# Patient Record
Sex: MALE | Race: BLACK OR AFRICAN AMERICAN | NOT HISPANIC OR LATINO
[De-identification: names, ages, dates, MRNs, and addresses within clinical notes are randomized per-mention and may not be internally consistent; named-entity substitution may affect disease eponyms.]

---

## 2017-01-27 PROBLEM — Z00.00 ENCOUNTER FOR PREVENTIVE HEALTH EXAMINATION: Status: ACTIVE | Noted: 2017-01-27

## 2017-01-30 ENCOUNTER — APPOINTMENT (OUTPATIENT)
Dept: CARDIOLOGY | Facility: CLINIC | Age: 62
End: 2017-01-30

## 2017-02-10 ENCOUNTER — APPOINTMENT (OUTPATIENT)
Dept: CARDIOLOGY | Facility: CLINIC | Age: 62
End: 2017-02-10

## 2017-02-17 ENCOUNTER — APPOINTMENT (OUTPATIENT)
Dept: CARDIOLOGY | Facility: CLINIC | Age: 62
End: 2017-02-17

## 2017-03-03 ENCOUNTER — APPOINTMENT (OUTPATIENT)
Dept: CARDIOLOGY | Facility: CLINIC | Age: 62
End: 2017-03-03

## 2017-03-10 ENCOUNTER — APPOINTMENT (OUTPATIENT)
Dept: CARDIOLOGY | Facility: CLINIC | Age: 62
End: 2017-03-10

## 2017-03-15 ENCOUNTER — APPOINTMENT (OUTPATIENT)
Dept: CARDIOLOGY | Facility: CLINIC | Age: 62
End: 2017-03-15

## 2017-03-17 ENCOUNTER — APPOINTMENT (OUTPATIENT)
Dept: CARDIOLOGY | Facility: CLINIC | Age: 62
End: 2017-03-17

## 2017-03-21 ENCOUNTER — APPOINTMENT (OUTPATIENT)
Dept: CARDIOLOGY | Facility: CLINIC | Age: 62
End: 2017-03-21

## 2018-01-10 ENCOUNTER — NON-APPOINTMENT (OUTPATIENT)
Age: 63
End: 2018-01-10

## 2018-01-10 ENCOUNTER — APPOINTMENT (OUTPATIENT)
Dept: CARDIOLOGY | Facility: CLINIC | Age: 63
End: 2018-01-10
Payer: COMMERCIAL

## 2018-01-10 VITALS
DIASTOLIC BLOOD PRESSURE: 86 MMHG | HEIGHT: 71 IN | BODY MASS INDEX: 36.96 KG/M2 | SYSTOLIC BLOOD PRESSURE: 146 MMHG | HEART RATE: 88 BPM | WEIGHT: 264 LBS

## 2018-01-10 DIAGNOSIS — Z87.891 PERSONAL HISTORY OF NICOTINE DEPENDENCE: ICD-10-CM

## 2018-01-10 PROCEDURE — 99214 OFFICE O/P EST MOD 30 MIN: CPT

## 2018-01-10 PROCEDURE — 93000 ELECTROCARDIOGRAM COMPLETE: CPT

## 2018-01-18 ENCOUNTER — APPOINTMENT (OUTPATIENT)
Dept: CARDIOLOGY | Facility: CLINIC | Age: 63
End: 2018-01-18
Payer: COMMERCIAL

## 2018-01-18 PROCEDURE — 93015 CV STRESS TEST SUPVJ I&R: CPT

## 2018-01-18 PROCEDURE — A9502: CPT

## 2018-01-18 PROCEDURE — 78452 HT MUSCLE IMAGE SPECT MULT: CPT

## 2018-02-06 ENCOUNTER — MEDICATION RENEWAL (OUTPATIENT)
Age: 63
End: 2018-02-06

## 2018-02-07 ENCOUNTER — APPOINTMENT (OUTPATIENT)
Dept: CARDIOLOGY | Facility: CLINIC | Age: 63
End: 2018-02-07
Payer: COMMERCIAL

## 2018-02-07 VITALS
WEIGHT: 261 LBS | SYSTOLIC BLOOD PRESSURE: 148 MMHG | DIASTOLIC BLOOD PRESSURE: 90 MMHG | HEIGHT: 72 IN | BODY MASS INDEX: 35.35 KG/M2 | HEART RATE: 62 BPM

## 2018-02-07 PROCEDURE — 99214 OFFICE O/P EST MOD 30 MIN: CPT

## 2018-02-23 ENCOUNTER — RECORD ABSTRACTING (OUTPATIENT)
Age: 63
End: 2018-02-23

## 2018-07-10 DIAGNOSIS — E87.5 HYPERKALEMIA: ICD-10-CM

## 2018-10-23 ENCOUNTER — RECORD ABSTRACTING (OUTPATIENT)
Age: 63
End: 2018-10-23

## 2018-10-24 ENCOUNTER — RECORD ABSTRACTING (OUTPATIENT)
Age: 63
End: 2018-10-24

## 2018-10-24 ENCOUNTER — APPOINTMENT (OUTPATIENT)
Dept: CARDIOLOGY | Facility: CLINIC | Age: 63
End: 2018-10-24
Payer: COMMERCIAL

## 2018-10-24 VITALS
SYSTOLIC BLOOD PRESSURE: 134 MMHG | HEART RATE: 79 BPM | DIASTOLIC BLOOD PRESSURE: 64 MMHG | HEIGHT: 72 IN | WEIGHT: 255 LBS | BODY MASS INDEX: 34.54 KG/M2 | OXYGEN SATURATION: 95 %

## 2018-10-24 DIAGNOSIS — I34.0 NONRHEUMATIC MITRAL (VALVE) INSUFFICIENCY: ICD-10-CM

## 2018-10-24 PROCEDURE — 99214 OFFICE O/P EST MOD 30 MIN: CPT

## 2018-10-24 RX ORDER — DIAZEPAM 10 MG/1
10 TABLET ORAL
Qty: 15 | Refills: 0 | Status: DISCONTINUED | COMMUNITY
Start: 2018-01-19 | End: 2018-10-24

## 2018-10-24 RX ORDER — SITAGLIPTIN AND METFORMIN HYDROCHLORIDE 100; 1000 MG/1; MG/1
100-1000 TABLET, FILM COATED, EXTENDED RELEASE ORAL DAILY
Refills: 0 | Status: ACTIVE | COMMUNITY
Start: 2017-08-05

## 2018-10-24 RX ORDER — PANTOPRAZOLE 40 MG/1
40 TABLET, DELAYED RELEASE ORAL
Qty: 60 | Refills: 0 | Status: DISCONTINUED | COMMUNITY
Start: 2017-07-28 | End: 2018-10-24

## 2018-10-24 RX ORDER — TAMSULOSIN HYDROCHLORIDE 0.4 MG/1
0.4 CAPSULE ORAL
Qty: 30 | Refills: 0 | Status: DISCONTINUED | COMMUNITY
Start: 2017-10-18 | End: 2018-10-24

## 2018-10-24 RX ORDER — PHENAZOPYRIDINE HYDROCHLORIDE 200 MG/1
200 TABLET ORAL
Qty: 10 | Refills: 0 | Status: DISCONTINUED | COMMUNITY
Start: 2017-10-24 | End: 2018-10-24

## 2018-10-24 RX ORDER — CIPROFLOXACIN HYDROCHLORIDE 500 MG/1
500 TABLET, FILM COATED ORAL
Qty: 16 | Refills: 0 | Status: DISCONTINUED | COMMUNITY
Start: 2017-10-24 | End: 2018-10-24

## 2018-10-24 RX ORDER — MONTELUKAST 10 MG/1
10 TABLET, FILM COATED ORAL DAILY
Refills: 0 | Status: ACTIVE | COMMUNITY
Start: 2017-07-28

## 2018-10-24 RX ORDER — CYCLOBENZAPRINE HYDROCHLORIDE 10 MG/1
10 TABLET, FILM COATED ORAL
Qty: 15 | Refills: 0 | Status: DISCONTINUED | COMMUNITY
Start: 2017-12-02 | End: 2018-10-24

## 2018-12-11 ENCOUNTER — APPOINTMENT (OUTPATIENT)
Dept: CARDIOLOGY | Facility: CLINIC | Age: 63
End: 2018-12-11

## 2018-12-11 ENCOUNTER — APPOINTMENT (OUTPATIENT)
Dept: CARDIOLOGY | Facility: CLINIC | Age: 63
End: 2018-12-11
Payer: COMMERCIAL

## 2018-12-11 PROCEDURE — 93880 EXTRACRANIAL BILAT STUDY: CPT

## 2018-12-11 PROCEDURE — 93306 TTE W/DOPPLER COMPLETE: CPT

## 2018-12-19 PROCEDURE — 93224 XTRNL ECG REC UP TO 48 HRS: CPT

## 2019-01-16 ENCOUNTER — APPOINTMENT (OUTPATIENT)
Dept: CARDIOLOGY | Facility: CLINIC | Age: 64
End: 2019-01-16
Payer: COMMERCIAL

## 2019-01-16 DIAGNOSIS — Z85.51 PERSONAL HISTORY OF MALIGNANT NEOPLASM OF BLADDER: ICD-10-CM

## 2019-01-16 DIAGNOSIS — Z98.890 OTHER SPECIFIED POSTPROCEDURAL STATES: ICD-10-CM

## 2019-01-16 PROCEDURE — 93015 CV STRESS TEST SUPVJ I&R: CPT

## 2019-01-16 PROCEDURE — 78452 HT MUSCLE IMAGE SPECT MULT: CPT

## 2019-01-16 PROCEDURE — A9502: CPT

## 2019-01-16 RX ORDER — DOXYCYCLINE 100 MG/1
100 CAPSULE ORAL
Qty: 42 | Refills: 0 | Status: DISCONTINUED | COMMUNITY
Start: 2017-07-28 | End: 2019-01-16

## 2019-01-16 RX ORDER — MOMETASONE 50 UG/1
50 SPRAY, METERED NASAL
Qty: 17 | Refills: 0 | Status: DISCONTINUED | COMMUNITY
Start: 2018-07-27 | End: 2019-01-16

## 2019-01-16 RX ORDER — ZOLPIDEM TARTRATE 10 MG/1
10 TABLET ORAL
Qty: 30 | Refills: 0 | Status: ACTIVE | COMMUNITY
Start: 2017-07-13

## 2019-01-17 ENCOUNTER — APPOINTMENT (OUTPATIENT)
Dept: CARDIOLOGY | Facility: CLINIC | Age: 64
End: 2019-01-17

## 2019-01-23 ENCOUNTER — APPOINTMENT (OUTPATIENT)
Dept: CARDIOLOGY | Facility: CLINIC | Age: 64
End: 2019-01-23
Payer: COMMERCIAL

## 2019-01-23 VITALS
HEIGHT: 72 IN | WEIGHT: 253 LBS | HEART RATE: 86 BPM | SYSTOLIC BLOOD PRESSURE: 132 MMHG | DIASTOLIC BLOOD PRESSURE: 68 MMHG | BODY MASS INDEX: 34.27 KG/M2 | OXYGEN SATURATION: 96 %

## 2019-01-23 PROCEDURE — 99214 OFFICE O/P EST MOD 30 MIN: CPT

## 2019-01-23 NOTE — PHYSICAL EXAM
[General Appearance - Well Developed] : well developed [Normal Appearance] : normal appearance [Well Groomed] : well groomed [General Appearance - Well Nourished] : well nourished [No Deformities] : no deformities [General Appearance - In No Acute Distress] : no acute distress [Normal Conjunctiva] : the conjunctiva exhibited no abnormalities [Eyelids - No Xanthelasma] : the eyelids demonstrated no xanthelasmas [Normal Jugular Venous A Waves Present] : normal jugular venous A waves present [Normal Jugular Venous V Waves Present] : normal jugular venous V waves present [No Jugular Venous Tavarez A Waves] : no jugular venous tavarez A waves [Respiration, Rhythm And Depth] : normal respiratory rhythm and effort [Exaggerated Use Of Accessory Muscles For Inspiration] : no accessory muscle use [Auscultation Breath Sounds / Voice Sounds] : lungs were clear to auscultation bilaterally [Heart Rate And Rhythm] : heart rate and rhythm were normal [Heart Sounds] : normal S1 and S2 [Murmurs] : no murmurs present [Abnormal Walk] : normal gait [Cyanosis, Localized] : no localized cyanosis [] : no ischemic changes [Skin Color & Pigmentation] : normal skin color and pigmentation [Skin Turgor] : normal skin turgor [Bowel Sounds] : normal bowel sounds [Abdomen Soft] : soft [Gait - Sufficient For Exercise Testing] : the gait was sufficient for exercise testing [Oriented To Time, Place, And Person] : oriented to person, place, and time [Impaired Insight] : insight and judgment were intact [Affect] : the affect was normal [Mood] : the mood was normal [No Anxiety] : not feeling anxious

## 2019-01-24 NOTE — HISTORY OF PRESENT ILLNESS
[FreeTextEntry1] : MYRA GARCÍA  is a 63 year M  who presents today Jan 23, 2019 in clinical follow-up and to review recent cardiovascular testing. Since last seen he has been feeling well. Completed nuclear stress test, echo, carotid and Holter monitor.\par Denies recent illness or hospital stay. \par \par As you are aware he is 63 of gentleman with medical history significant for coronary artery disease status post PTCA with eluting stent to the LAD in 2009, history of diabetes, hypertension, hyperlipidemia. He also has long-standing history of chronic orthopedic complaints but bilateral knee replacement, with improvement.\par \par Today he denies chest pain, pressure, unusual shortness of breath, lightheadedness, dizziness, near syncope or syncope. \par \par Echocardiogram December 11, 2018 ejection fraction 60%, minimal mitral regurgitation, dilated left ventricle \par \par Nuclear stress test January 2019 mild defect mid anterior wall and mid inferior wall that is reversible suggestive of infarct with antonieta-infarct ischemia\par \par Carotid duplex  12/11/18 mild nonobstructive disease\par \par EKG today reveals normal sinus rhythm with nonspecific ST segment changes and PVC.\par \par stress testing performed March 2017, which was negative for ischemia by suspect, however there was exaggeration of baseline ST segment changes.\par \par Echocardiography March 15, 2017 revealed EF of 60%, aortic valve with decreased opening calculated at 1.2, minimal valvular disease otherwise.\par \par Carotid sonogram March 15, 2017 revealed bilateral nonobstructive disease however distal RCA and LICA were not well-visualized. \par \par Holter monitor also March of 2017 revealed basic rhythm of sinus and rare PACs and PVCs.

## 2019-01-24 NOTE — ASSESSMENT
[FreeTextEntry1] : MYRA GARCÍA  is a 63 year M  who presents today Jan 23, 2019 in clinical follow-up with the above history and the following active issues. \par \par Coronary artery disease prior PCI.  Recent nuclear stress test suggestive of possible antonieta-infarct ischemia.  Dilated left ventricle on echocardiogram.  Given history of coronary artery disease, diabetes mellitus, hyperlipidemia, hypertension.  Patient is recommended for left heart cardiac catheterization.  Prior cardiac catheterization performed at [facility] with Dr. Oconnell.  Patient was requesting to have procedure done at the same facility.  I have contacted Dr. Oconnell's office and spoke with Tiffany.  Nuclear stress test and echocardiogram will be faxed to their office and they will contact the patient to schedule the procedure.\par Recommend decreasing Nifedipine to 1/2 tab QD and start Carvedilol 3.125mg BID and Lisinopril 10mg QD.\par Follow-up BMP with medication change. BP/HR evaluation in our office in 2 weeks. \par In the future attempt to discontinue Nifedipine and optimize Carvedilol and Lisinopril. \par \par Hypertension, blood pressure controlled. Medication adjustments as stated above with follow-up BP check in our office in 2 weeks. \par \par Hyperlipidemia tolerating statins. Low cholesterol diet reviewed. \par Lifestyle and risk factor modification.\par \par Diabetes mellitus, continue to follow up his primary care physician.\par \par Red flag symptoms which would warrant sooner emergent evaluation reviewed with the patient. \par Questions and concerns were addressed and answered. \par \par Sincerely,\par \par Jud Lang PA-C\par Patients history, testing and plan reviewed with supervising MD: Dr. Erick Velazquez

## 2019-02-06 ENCOUNTER — APPOINTMENT (OUTPATIENT)
Dept: CARDIOLOGY | Facility: CLINIC | Age: 64
End: 2019-02-06
Payer: COMMERCIAL

## 2019-02-06 VITALS
HEIGHT: 72 IN | HEART RATE: 91 BPM | DIASTOLIC BLOOD PRESSURE: 64 MMHG | WEIGHT: 253 LBS | OXYGEN SATURATION: 98 % | BODY MASS INDEX: 34.27 KG/M2 | SYSTOLIC BLOOD PRESSURE: 110 MMHG

## 2019-02-06 PROCEDURE — 99214 OFFICE O/P EST MOD 30 MIN: CPT

## 2019-02-12 ENCOUNTER — APPOINTMENT (OUTPATIENT)
Dept: CARDIOLOGY | Facility: CLINIC | Age: 64
End: 2019-02-12
Payer: COMMERCIAL

## 2019-02-12 VITALS
BODY MASS INDEX: 34.27 KG/M2 | HEIGHT: 72 IN | DIASTOLIC BLOOD PRESSURE: 68 MMHG | OXYGEN SATURATION: 97 % | SYSTOLIC BLOOD PRESSURE: 128 MMHG | HEART RATE: 84 BPM | WEIGHT: 253 LBS

## 2019-02-12 PROCEDURE — 99214 OFFICE O/P EST MOD 30 MIN: CPT

## 2019-02-12 NOTE — PHYSICAL EXAM
[General Appearance - Well Developed] : well developed [Normal Appearance] : normal appearance [Well Groomed] : well groomed [General Appearance - Well Nourished] : well nourished [No Deformities] : no deformities [General Appearance - In No Acute Distress] : no acute distress [Normal Conjunctiva] : the conjunctiva exhibited no abnormalities [Eyelids - No Xanthelasma] : the eyelids demonstrated no xanthelasmas [Normal Oral Mucosa] : normal oral mucosa [No Oral Pallor] : no oral pallor [No Oral Cyanosis] : no oral cyanosis [Normal Jugular Venous A Waves Present] : normal jugular venous A waves present [Normal Jugular Venous V Waves Present] : normal jugular venous V waves present [No Jugular Venous Tavarez A Waves] : no jugular venous tavarez A waves [Respiration, Rhythm And Depth] : normal respiratory rhythm and effort [Exaggerated Use Of Accessory Muscles For Inspiration] : no accessory muscle use [Auscultation Breath Sounds / Voice Sounds] : lungs were clear to auscultation bilaterally [Heart Rate And Rhythm] : heart rate and rhythm were normal [Heart Sounds] : normal S1 and S2 [Murmurs] : no murmurs present [Bowel Sounds] : normal bowel sounds [Abdomen Soft] : soft [Abnormal Walk] : normal gait [Gait - Sufficient For Exercise Testing] : the gait was sufficient for exercise testing [Cyanosis, Localized] : no localized cyanosis [] : no ischemic changes [Skin Color & Pigmentation] : normal skin color and pigmentation [Skin Turgor] : normal skin turgor [Oriented To Time, Place, And Person] : oriented to person, place, and time [Impaired Insight] : insight and judgment were intact [Affect] : the affect was normal [Mood] : the mood was normal [No Anxiety] : not feeling anxious

## 2019-02-12 NOTE — HISTORY OF PRESENT ILLNESS
[FreeTextEntry1] : MYRA GARCÍA  is a 63 year M  who presents today February 12, 2019 in clinical follow-up. Since last seen on January 23, 2019 he underwent a cardiac cath at [facility] which he was told was normal.\par There are no new symptoms.\par \par As you are aware he is 63 of gentleman with medical history significant for coronary artery disease status post PTCA with eluting stent to the LAD in 2009, history of diabetes, hypertension, hyperlipidemia. He also has long-standing history of chronic orthopedic complaints but bilateral knee replacement, with improvement.\par \par Today he denies chest pain, pressure, unusual shortness of breath, lightheadedness, dizziness, near syncope or syncope. \par \par Echocardiogram December 11, 2018 ejection fraction 60%, minimal mitral regurgitation, dilated left ventricle \par \par Nuclear stress test January 2019 mild defect mid anterior wall and mid inferior wall that is reversible suggestive of infarct with antonieta-infarct ischemia\par \par Carotid duplex  12/11/18 mild nonobstructive disease\par \par EKG today reveals normal sinus rhythm with nonspecific ST segment changes and PVC.\par \par stress testing performed March 2017, which was negative for ischemia by suspect, however there was exaggeration of baseline ST segment changes.\par \par Echocardiography March 15, 2017 revealed EF of 60%, aortic valve with decreased opening calculated at 1.2, minimal valvular disease otherwise.\par \par Carotid sonogram March 15, 2017 revealed bilateral nonobstructive disease however distal RCA and LICA were not well-visualized. \par \par Holter monitor also March of 2017 revealed basic rhythm of sinus and rare PACs and PVCs.

## 2019-02-12 NOTE — ASSESSMENT
[FreeTextEntry1] : MYRA GARCÍA  is a 63 year M  who presents today Jan 23, 2019 in clinical follow-up with the above history and the following active issues. \par \par Coronary artery disease prior PCI.  Recent nuclear stress test suggestive of possible antonieta-infarct ischemia.  Dilated left ventricle on echocardiogram.  Given history of coronary artery disease, diabetes mellitus, hyperlipidemia, hypertension patient was recommended for left heart cardiac catheterization. As per the patient the cath was diagnostic and coronary arteries and prior PCI patent. Operative report has been requested - performed at [facility] with Dr. Oconnell. Currently taking Toprol and  Carvedilol 3.125mg BID and Lisinopril 10mg QD. Discontinue Toprol and increase Coreg to 6.25mg BID. \par Follow-up BMP with medication change. BP/HR evaluation in our office in 2 weeks. \par \par Hypertension, blood pressure controlled. Medication adjustments as stated above.\par \par Hyperlipidemia tolerating statins. Low cholesterol diet reviewed. \par Lifestyle and risk factor modification.\par \par Diabetes mellitus, continue to follow up his primary care physician.\par \par Red flag symptoms which would warrant sooner emergent evaluation reviewed with the patient. \par Questions and concerns were addressed and answered. \par \par Clinical follow-up with Dr. Crandall\par \par Sincerely,\par \par Jud Lang PA-C\par Patients history, testing and plan reviewed with supervising MD: Dr. Erick Velazquez

## 2019-03-06 ENCOUNTER — APPOINTMENT (OUTPATIENT)
Dept: CARDIOLOGY | Facility: CLINIC | Age: 64
End: 2019-03-06
Payer: COMMERCIAL

## 2019-03-06 VITALS
DIASTOLIC BLOOD PRESSURE: 80 MMHG | HEART RATE: 84 BPM | HEIGHT: 72 IN | SYSTOLIC BLOOD PRESSURE: 126 MMHG | BODY MASS INDEX: 34.95 KG/M2 | OXYGEN SATURATION: 98 % | WEIGHT: 258 LBS

## 2019-03-06 PROCEDURE — 99214 OFFICE O/P EST MOD 30 MIN: CPT

## 2019-03-06 RX ORDER — NIFEDIPINE 60 MG/1
60 TABLET, FILM COATED, EXTENDED RELEASE ORAL
Refills: 1 | Status: DISCONTINUED | COMMUNITY
Start: 2017-08-11 | End: 2019-03-06

## 2019-03-20 NOTE — ASSESSMENT
[FreeTextEntry1] : MYRA GARCÍA is a 63 year old M who presents today Mar 06, 2019 with the above history and the following active issues: \par \par Coronary artery disease prior PCI.  Recent nuclear stress test suggestive of possible antonieta-infarct ischemia.  Dilated left ventricle on echocardiogram.  Given history of coronary artery disease, diabetes mellitus, hyperlipidemia, hypertension patient was recommended for left heart cardiac catheterization. As per the patient the cath was diagnostic and coronary arteries/prior PCI patent. Operative report has been requested - performed at [facility] with Dr. Oconnell. Currently taking Asa 81mg and Atorvastatin 40mg. Asymptomatic at present from a cardiac standpoint. Continue present medications. \par \par Numbness/tingling to RUE. Unlikely that his current sx are cardiac related. Reviewed testing above. There is no chest pain or dyspnea. I advised him to seek neurologic consultation to evaluate for cervical spine pathology vs. carpal tunnel syndrome. Given his history of DM, neuropathy is also a possibility. For any escalating of current symptoms or chest pain he was advised to seek emergent medical evaluation. \par \par Hypertension, blood pressure controlled. Currently taking Carvedilol 6.25mg BID and Lisinopril 10mg QD. BP and HR very well controlled on today's exam. There are no adverse effect noted. Continue current rx and low salt diet. \par \par Hyperlipidemia tolerating statin, zetia, and fibrate. Low cholesterol diet reviewed. Lifestyle and risk factor modification. Close monitoring of lipid panel and hepatic panel. \par \par Diabetes mellitus, continue to follow up his primary care physician.\par \par F/U with our office in 6 months for routine cardiovascular care unless otherwise indicated. \par Discussed red flag symptoms, which would warrant sooner or emergent medical evaluation.\par Any questions and concerns were addressed and resolved. \par \par Sincerely,\par \par LIZETH Castrejon\par Patients history, testing, and plan reviewed with supervising MD: Dr. Beba Crandall

## 2019-03-20 NOTE — HISTORY OF PRESENT ILLNESS
[FreeTextEntry1] : MYRA GARCÍA  is a 63 year M  who presents today in clinical follow-up.\par \par He was last seen on 2/12/19 to review results of cardiac catheterization. He was asymptomatic prior to cath but had abnormal noninvasive ischemic workup. Given his history, he underwent a cardiac cath at [facility] which he was told was normal. Since then, there has been no illness or hospitalization. \par \par He reports over the past few weeks feeling intermittently numbness/tingling starting at his upper right arm and radiating to his hand. This comes and goes on its own. No particular aggravating or alleviating factors. Lasts seconds at a time. No associated neurologic symptoms. Denies facial paralysis, blurry vision ,headache, slurred speech, or other FND. \par \par He was seen by primary care who believed that carpal tunnel was part differential dx. Rx prednisone. Given his recent history as noted above, patient referred himself to our office. Denies exertional chest pain or discomfort. Denies unusual shortness of breath, orthopnea, weight gain, or LE edema. Denies palpitations, lightheadedness, dizziness, or syncope.  Denies any unusual bleeding or black/tarry stools. \par \par As you are aware, he has a past medical history significant for coronary artery disease status post PTCA with eluting stent to the LAD in 2009, history of diabetes, hypertension, hyperlipidemia. He also has long-standing history of chronic orthopedic complaints but bilateral knee replacement, with improvement.\par \par Past testing for reference:\par Echocardiogram December 11, 2018 ejection fraction 60%, minimal mitral regurgitation, dilated left ventricle \par \par Nuclear stress test January 2019 mild defect mid anterior wall and mid inferior wall that is reversible suggestive of infarct with antonieta-infarct ischemia\par \par *requested records of cardiac cath report at [facility] by Dr. García\par \par Carotid duplex  12/11/18 mild nonobstructive disease\par \par EKG today reveals normal sinus rhythm with nonspecific ST segment changes and PVC.\par \par stress testing performed March 2017, which was negative for ischemia by suspect, however there was exaggeration of baseline ST segment changes.\par \par Echocardiography March 15, 2017 revealed EF of 60%, aortic valve with decreased opening calculated at 1.2, minimal valvular disease otherwise.\par \par Carotid sonogram March 15, 2017 revealed bilateral nonobstructive disease however distal RCA and LICA were not well-visualized. \par \par Holter monitor also March of 2017 revealed basic rhythm of sinus and rare PACs and PVCs.

## 2019-03-20 NOTE — PHYSICAL EXAM
[General Appearance - Well Developed] : well developed [Normal Appearance] : normal appearance [Well Groomed] : well groomed [General Appearance - Well Nourished] : well nourished [No Deformities] : no deformities [General Appearance - In No Acute Distress] : no acute distress [Normal Conjunctiva] : the conjunctiva exhibited no abnormalities [Eyelids - No Xanthelasma] : the eyelids demonstrated no xanthelasmas [No Oral Pallor] : no oral pallor [No Oral Cyanosis] : no oral cyanosis [Normal Jugular Venous A Waves Present] : normal jugular venous A waves present [Normal Jugular Venous V Waves Present] : normal jugular venous V waves present [No Jugular Venous Tavarez A Waves] : no jugular venous tavarez A waves [Respiration, Rhythm And Depth] : normal respiratory rhythm and effort [Exaggerated Use Of Accessory Muscles For Inspiration] : no accessory muscle use [Auscultation Breath Sounds / Voice Sounds] : lungs were clear to auscultation bilaterally [Heart Rate And Rhythm] : heart rate and rhythm were normal [Heart Sounds] : normal S1 and S2 [Murmurs] : no murmurs present [Bowel Sounds] : normal bowel sounds [Abdomen Soft] : soft [Abnormal Walk] : normal gait [Gait - Sufficient For Exercise Testing] : the gait was sufficient for exercise testing [Cyanosis, Localized] : no localized cyanosis [] : no ischemic changes [Skin Color & Pigmentation] : normal skin color and pigmentation [Skin Turgor] : normal skin turgor [Oriented To Time, Place, And Person] : oriented to person, place, and time [Impaired Insight] : insight and judgment were intact [Affect] : the affect was normal [Mood] : the mood was normal [No Anxiety] : not feeling anxious [FreeTextEntry1] : NVID, strong distal pulses, good capillary refill

## 2019-05-16 ENCOUNTER — APPOINTMENT (OUTPATIENT)
Dept: CARDIOLOGY | Facility: CLINIC | Age: 64
End: 2019-05-16

## 2019-05-20 ENCOUNTER — MEDICATION RENEWAL (OUTPATIENT)
Age: 64
End: 2019-05-20

## 2019-05-22 ENCOUNTER — MEDICATION RENEWAL (OUTPATIENT)
Age: 64
End: 2019-05-22

## 2019-06-11 ENCOUNTER — APPOINTMENT (OUTPATIENT)
Dept: CARDIOLOGY | Facility: CLINIC | Age: 64
End: 2019-06-11
Payer: COMMERCIAL

## 2019-06-11 VITALS
OXYGEN SATURATION: 97 % | HEART RATE: 69 BPM | BODY MASS INDEX: 34.95 KG/M2 | HEIGHT: 72 IN | DIASTOLIC BLOOD PRESSURE: 70 MMHG | SYSTOLIC BLOOD PRESSURE: 124 MMHG | WEIGHT: 258 LBS

## 2019-06-11 DIAGNOSIS — Z87.898 PERSONAL HISTORY OF OTHER SPECIFIED CONDITIONS: ICD-10-CM

## 2019-06-11 DIAGNOSIS — Z95.5 PRESENCE OF CORONARY ANGIOPLASTY IMPLANT AND GRAFT: ICD-10-CM

## 2019-06-11 DIAGNOSIS — R94.39 ABNORMAL RESULT OF OTHER CARDIOVASCULAR FUNCTION STUDY: ICD-10-CM

## 2019-06-11 DIAGNOSIS — R07.89 OTHER CHEST PAIN: ICD-10-CM

## 2019-06-11 PROCEDURE — 99214 OFFICE O/P EST MOD 30 MIN: CPT

## 2019-06-11 NOTE — PHYSICAL EXAM
[General Appearance - Well Developed] : well developed [Normal Appearance] : normal appearance [Well Groomed] : well groomed [General Appearance - In No Acute Distress] : no acute distress [General Appearance - Well Nourished] : well nourished [No Deformities] : no deformities [Normal Conjunctiva] : the conjunctiva exhibited no abnormalities [Eyelids - No Xanthelasma] : the eyelids demonstrated no xanthelasmas [No Oral Cyanosis] : no oral cyanosis [Normal Jugular Venous A Waves Present] : normal jugular venous A waves present [No Oral Pallor] : no oral pallor [Normal Jugular Venous V Waves Present] : normal jugular venous V waves present [No Jugular Venous Tavarez A Waves] : no jugular venous tavarez A waves [Auscultation Breath Sounds / Voice Sounds] : lungs were clear to auscultation bilaterally [Respiration, Rhythm And Depth] : normal respiratory rhythm and effort [Exaggerated Use Of Accessory Muscles For Inspiration] : no accessory muscle use [Heart Rate And Rhythm] : heart rate and rhythm were normal [Heart Sounds] : normal S1 and S2 [Murmurs] : no murmurs present [Abdomen Soft] : soft [Bowel Sounds] : normal bowel sounds [Abnormal Walk] : normal gait [Cyanosis, Localized] : no localized cyanosis [Gait - Sufficient For Exercise Testing] : the gait was sufficient for exercise testing [] : no ischemic changes [Skin Color & Pigmentation] : normal skin color and pigmentation [Oriented To Time, Place, And Person] : oriented to person, place, and time [Skin Turgor] : normal skin turgor [Impaired Insight] : insight and judgment were intact [Mood] : the mood was normal [No Anxiety] : not feeling anxious [Affect] : the affect was normal [FreeTextEntry1] : NVID, strong distal pulses, good capillary refill

## 2019-06-11 NOTE — REASON FOR VISIT
[Follow-Up - Clinic] : a clinic follow-up of [FreeTextEntry2] : preop left rotator cuff surgery Dr Gan [facility]  [FreeTextEntry1] : Trevon is a 63-year-old male with history of hypertension, dyslipidemia, DM 2, bilateral TKA, chest pain, CAD, PCI, TERESE LAD x2 2009, nonobstructive cath Feb 2019.\par \par Cardiovascular review of symptoms is negative for exertional chest pain, dyspnea, palpitations, dizziness or syncope.  No PND or orthopnea leg edema.  No bleeding or black stool.\par \par Patient is walking more than 30 minutes without exertional chest pain.  Patient states he walks 10,000 steps per day.  \par \par Cardiac catheterization February 2019 Dr aGrcía, nonobstructive, LAD stent patent with mid LAD 50%, LVEF 55%, severely elevated LVEDP, medical management.\par \par Exercise Myoview stress test January 2019 LVEF 40%, small mid inferior ischemic defect, apical scar, no chest pain, nonischemic EKG, baseline sinus rhythm PRWP\par \par Echocardiography March 15, 2017 revealed EF of 60%, aortic valve with decreased opening calculated at 1.2, minimal valvular disease otherwise.\par \par Carotid sonogram March 15, 2017 revealed bilateral nonobstructive disease however distal RCA and LICA were not well-visualized. \par \par Holter monitor also March of 2017 revealed basic rhythm of sinus and rare PACs and PVCs.

## 2019-06-11 NOTE — ASSESSMENT
[FreeTextEntry1] : Trevon is a 63-year-old male with medical history detailed above and active medical issues including:\par \par - Patient is seen for preop left rotator cuff surgery Dr Gan [facility]. Patient is optimized from a cardiovascular perspective and may proceed with surgery.  Patient currently not on anticoagulation.  Optimally patient should continue aspirin 81mg daily up to the time of surgery in the setting of PCI stent 2009.  If absolutely necessary aspirin may be held for 5 days.  Please call with any further questions.\par \par - No anginal symptoms, small ischemic defect on Myoview stress test with nonobstructive catheterization February 2019.\par \par - History of angina, CAD, PCI DS x2 LAD 2009\par \par - Hypertension at the BP goal less than 130/80 on coronary\par \par - Dyslipidemia on lovastatin well tolerated\par \par - Type 2 diabetes management with PCP\par \par Advised patient to follow active lifestyle with regular cardiovascular exercise. Patient educated on lifestyle and diet modification with antidiabetic low sodium low fat diet and avoidance of excessive alcohol. Patient is aware to call with any symptoms or concerns. \par \par Current cardiac medications remain unchanged. Repeat labs will be ordered with PMD.\par Trevon will followup with Dr Reji Mireles for primary care\par

## 2019-07-19 ENCOUNTER — MEDICATION RENEWAL (OUTPATIENT)
Age: 64
End: 2019-07-19

## 2019-10-21 ENCOUNTER — MEDICATION RENEWAL (OUTPATIENT)
Age: 64
End: 2019-10-21

## 2019-12-02 ENCOUNTER — MEDICATION RENEWAL (OUTPATIENT)
Age: 64
End: 2019-12-02

## 2020-03-05 ENCOUNTER — APPOINTMENT (OUTPATIENT)
Dept: CARDIOLOGY | Facility: CLINIC | Age: 65
End: 2020-03-05
Payer: COMMERCIAL

## 2020-03-05 VITALS
OXYGEN SATURATION: 97 % | HEART RATE: 76 BPM | BODY MASS INDEX: 34.13 KG/M2 | DIASTOLIC BLOOD PRESSURE: 64 MMHG | WEIGHT: 252 LBS | HEIGHT: 72 IN | SYSTOLIC BLOOD PRESSURE: 132 MMHG

## 2020-03-05 DIAGNOSIS — Z01.810 ENCOUNTER FOR PREPROCEDURAL CARDIOVASCULAR EXAMINATION: ICD-10-CM

## 2020-03-05 PROCEDURE — 99214 OFFICE O/P EST MOD 30 MIN: CPT

## 2020-03-05 NOTE — REASON FOR VISIT
[Follow-Up - Clinic] : a clinic follow-up of [FreeTextEntry2] : preop left rotator cuff surgery Dr Gan [facility]  [FreeTextEntry1] : MYRA GARCÍA  is a 64 year M  who presents today Mar 05, 2020 for preoperative clearance for carpal tunnel release. Since last seen there has been no recent illness or hospital stay. Overall he has been feeling well. Remains very active on a regular basis with no new exertional complaints. \par \par History of hypertension, dyslipidemia, DM 2, bilateral TKA, chest pain, CAD, PCI, TERESE LAD x2 2009, nonobstructive cath Feb 2019.\par \par Today he denies chest pain, pressure, unusual shortness of breath, lightheadedness, dizziness, near syncope or syncope. \par \par Patient is walking more than 30 minutes without exertional chest pain.  Patient states he walks 10,000 steps per day.  \par \par Cardiac catheterization February 2019 Dr García, nonobstructive, LAD stent patent with mid LAD 50%, LVEF 55%, severely elevated LVEDP, medical management.\par \par Exercise Myoview stress test January 2019 LVEF 40%, small mid inferior ischemic defect, apical scar, no chest pain, nonischemic EKG, baseline sinus rhythm PRWP\par \par Echocardiography March 15, 2017 revealed EF of 60%, aortic valve with decreased opening calculated at 1.2, minimal valvular disease otherwise.\par \par Carotid sonogram March 15, 2017 revealed bilateral nonobstructive disease however distal RCA and LICA were not well-visualized. \par \par Holter monitor also March of 2017 revealed basic rhythm of sinus and rare PACs and PVCs.

## 2020-03-05 NOTE — PHYSICAL EXAM
[General Appearance - Well Developed] : well developed [Normal Appearance] : normal appearance [Well Groomed] : well groomed [General Appearance - Well Nourished] : well nourished [No Deformities] : no deformities [General Appearance - In No Acute Distress] : no acute distress [Normal Conjunctiva] : the conjunctiva exhibited no abnormalities [Eyelids - No Xanthelasma] : the eyelids demonstrated no xanthelasmas [No Oral Pallor] : no oral pallor [No Oral Cyanosis] : no oral cyanosis [Normal Jugular Venous A Waves Present] : normal jugular venous A waves present [Normal Jugular Venous V Waves Present] : normal jugular venous V waves present [No Jugular Venous Tavarez A Waves] : no jugular venous tavarez A waves [Respiration, Rhythm And Depth] : normal respiratory rhythm and effort [Exaggerated Use Of Accessory Muscles For Inspiration] : no accessory muscle use [Auscultation Breath Sounds / Voice Sounds] : lungs were clear to auscultation bilaterally [Heart Rate And Rhythm] : heart rate and rhythm were normal [Heart Sounds] : normal S1 and S2 [Murmurs] : no murmurs present [Bowel Sounds] : normal bowel sounds [Abdomen Soft] : soft [Abnormal Walk] : normal gait [Gait - Sufficient For Exercise Testing] : the gait was sufficient for exercise testing [Cyanosis, Localized] : no localized cyanosis [] : no ischemic changes [FreeTextEntry1] : NVID, strong distal pulses, good capillary refill [Skin Color & Pigmentation] : normal skin color and pigmentation [Skin Turgor] : normal skin turgor [Oriented To Time, Place, And Person] : oriented to person, place, and time [Impaired Insight] : insight and judgment were intact [Affect] : the affect was normal [Mood] : the mood was normal [No Anxiety] : not feeling anxious

## 2020-03-05 NOTE — ASSESSMENT
[FreeTextEntry1] : MYRA GARCÍA  is a 64 year M  who presents today Mar 05, 2020 with the above history and the following active issues. \par \par Preoperative status for carpal tunnel release \par At present, there are no active cardiac conditions. \par No recent unstable coronary syndromes, decompensated heart failure, severe valvular heart disease or significant dysrhythmias.  \par Baseline functional status is good without exertional complaints.    \par The clinical benefit of the proposed procedure outweighs the associated cardiovascular risk.  \par Risk not attenuated with further CV testing.  \par Prior testing as outlined above.\par Optimized from a cardiovascular perspective.\par Minimize time off ASA\par Continue beta blocker\par If absolutely necessary aspirin may be held for 5 days.  Please call with any further questions.\par \par CAD with no anginal symptoms, small ischemic defect on Myoview stress test with nonobstructive catheterization February 2019. PCI DS x2 LAD 2009\par \par - Hypertension, blood pressure well controlled on my assessment\par \par - Dyslipidemia on lovastatin well tolerated\par \par - Type 2 diabetes management with PCP\par \par Red flag symptoms which would warrant sooner emergent evaluation reviewed with the patient. \par Questions and concerns were addressed and answered. \par \par Sincerely,\par \par Jud Lang PA-C\par Patients history, testing and plan reviewed with supervising MD: Dr. Loyd Zamora \par

## 2020-08-18 RX ORDER — FENOFIBRATE 145 MG/1
145 TABLET, COATED ORAL DAILY
Refills: 0 | Status: DISCONTINUED | COMMUNITY
Start: 2017-07-10 | End: 2020-08-18

## 2020-08-18 RX ORDER — EZETIMIBE 10 MG/1
TABLET ORAL
Refills: 0 | Status: DISCONTINUED | COMMUNITY
End: 2020-08-18

## 2020-11-05 ENCOUNTER — APPOINTMENT (OUTPATIENT)
Dept: CARDIOLOGY | Facility: CLINIC | Age: 65
End: 2020-11-05

## 2020-12-23 PROBLEM — Z01.810 ENCOUNTER FOR PREOPERATIVE VASCULAR EXAMINATION: Status: RESOLVED | Noted: 2020-03-05 | Resolved: 2020-12-23

## 2021-01-14 ENCOUNTER — APPOINTMENT (OUTPATIENT)
Dept: CARDIOLOGY | Facility: CLINIC | Age: 66
End: 2021-01-14
Payer: MEDICARE

## 2021-01-14 VITALS
DIASTOLIC BLOOD PRESSURE: 80 MMHG | SYSTOLIC BLOOD PRESSURE: 120 MMHG | HEIGHT: 72 IN | BODY MASS INDEX: 34.13 KG/M2 | HEART RATE: 87 BPM | WEIGHT: 252 LBS | OXYGEN SATURATION: 98 %

## 2021-01-14 DIAGNOSIS — Z01.818 ENCOUNTER FOR OTHER PREPROCEDURAL EXAMINATION: ICD-10-CM

## 2021-01-14 PROCEDURE — 99214 OFFICE O/P EST MOD 30 MIN: CPT

## 2021-01-14 PROCEDURE — 93000 ELECTROCARDIOGRAM COMPLETE: CPT

## 2021-01-14 RX ORDER — MELOXICAM 7.5 MG/1
7.5 TABLET ORAL DAILY
Refills: 0 | Status: DISCONTINUED | COMMUNITY
Start: 2018-09-14 | End: 2021-01-14

## 2021-01-14 NOTE — REASON FOR VISIT
[Follow-Up - Clinic] : a clinic follow-up of [FreeTextEntry2] : preop left rotator cuff surgery Dr Gan [facility] [place]  [FreeTextEntry1] : MYRA GARCÍA  is a 65 year M  who presents today Jan 14, 2021 for preoperative clearance for carpal tunnel release. Since last seen there has been no recent illness or hospital stay. Overall he has been feeling well. Remains very active on a regular basis with no new exertional complaints. \par He was last in our office in March 2020 for preoperative clearance and the procedure was cancelled due to the pandemic. \par \par History of hypertension, dyslipidemia, DM 2, bilateral TKA, chest pain, CAD, PCI, TERESE LAD x2 2009, nonobstructive cath Feb 2019.\par \par Today he denies chest pain, pressure, unusual shortness of breath, lightheadedness, dizziness, near syncope or syncope. \par \par Patient is walking more than 30 minutes without exertional chest pain.  Patient states he walks 10,000 steps per day.  \par \par Cardiac catheterization February 2019 Dr García, nonobstructive, LAD stent patent with mid LAD 50%, LVEF 55%, severely elevated LVEDP, medical management.\par \par Exercise Myoview stress test January 2019 LVEF 40%, small mid inferior ischemic defect, apical scar, no chest pain, nonischemic EKG, baseline sinus rhythm PRWP\par \par Echocardiography March 15, 2017 revealed EF of 60%, aortic valve with decreased opening calculated at 1.2, minimal valvular disease otherwise.\par \par Carotid sonogram March 15, 2017 revealed bilateral nonobstructive disease however distal RCA and LICA were not well-visualized. \par \par Holter monitor also March of 2017 revealed basic rhythm of sinus and rare PACs and PVCs.

## 2021-01-14 NOTE — ASSESSMENT
[FreeTextEntry1] : MYRA GARCÍA  is a 65 year M  who presents today Jan 14, 2021 with the above history and the following active issues. \par \par Preoperative status for carpal tunnel release \par At present, there are no active cardiac conditions. \par No recent unstable coronary syndromes, decompensated heart failure, severe valvular heart disease or significant dysrhythmias.  \par Baseline functional status is good without exertional complaints.    \par The clinical benefit of the proposed procedure outweighs the associated cardiovascular risk.  \par Risk not attenuated with further CV testing.  \par Prior testing as outlined above.\par Optimized from a cardiovascular perspective.\par Minimize time off ASA\par Continue beta blocker\par If absolutely necessary aspirin may be held for 5 days.  Please call with any further questions.\par \par CAD with no anginal symptoms, small ischemic defect on Myoview stress test with nonobstructive catheterization February 2019. PCI DS x2 LAD 2009\par \par - Hypertension, blood pressure well controlled on my assessment\par \par - Dyslipidemia on lovastatin well tolerated\par \par - Type 2 diabetes management with PCP\par \par Red flag symptoms which would warrant sooner emergent evaluation reviewed with the patient. \par Questions and concerns were addressed and answered. \par \par Sincerely,\par \par Jud Lang PA-C\par Patients history, testing and plan reviewed with supervising MD: Dr. Valentino

## 2021-01-14 NOTE — PHYSICAL EXAM
[General Appearance - Well Developed] : well developed [Normal Appearance] : normal appearance [Well Groomed] : well groomed [General Appearance - Well Nourished] : well nourished [No Deformities] : no deformities [General Appearance - In No Acute Distress] : no acute distress [Normal Conjunctiva] : the conjunctiva exhibited no abnormalities [Eyelids - No Xanthelasma] : the eyelids demonstrated no xanthelasmas [No Oral Pallor] : no oral pallor [No Oral Cyanosis] : no oral cyanosis [Normal Jugular Venous A Waves Present] : normal jugular venous A waves present [Normal Jugular Venous V Waves Present] : normal jugular venous V waves present [No Jugular Venous Tavarez A Waves] : no jugular venous tavarez A waves [Respiration, Rhythm And Depth] : normal respiratory rhythm and effort [Exaggerated Use Of Accessory Muscles For Inspiration] : no accessory muscle use [Auscultation Breath Sounds / Voice Sounds] : lungs were clear to auscultation bilaterally [Heart Rate And Rhythm] : heart rate and rhythm were normal [Heart Sounds] : normal S1 and S2 [Murmurs] : no murmurs present [Bowel Sounds] : normal bowel sounds [Abdomen Soft] : soft [Abnormal Walk] : normal gait [Gait - Sufficient For Exercise Testing] : the gait was sufficient for exercise testing [Cyanosis, Localized] : no localized cyanosis [] : no ischemic changes [Skin Color & Pigmentation] : normal skin color and pigmentation [Skin Turgor] : normal skin turgor [Oriented To Time, Place, And Person] : oriented to person, place, and time [Impaired Insight] : insight and judgment were intact [Affect] : the affect was normal [Mood] : the mood was normal [No Anxiety] : not feeling anxious

## 2021-08-09 ENCOUNTER — APPOINTMENT (OUTPATIENT)
Dept: CARDIOLOGY | Facility: CLINIC | Age: 66
End: 2021-08-09
Payer: MEDICARE

## 2021-08-09 ENCOUNTER — NON-APPOINTMENT (OUTPATIENT)
Age: 66
End: 2021-08-09

## 2021-08-09 VITALS
HEIGHT: 72 IN | HEART RATE: 64 BPM | WEIGHT: 241 LBS | TEMPERATURE: 97.8 F | OXYGEN SATURATION: 97 % | BODY MASS INDEX: 32.64 KG/M2 | SYSTOLIC BLOOD PRESSURE: 132 MMHG | DIASTOLIC BLOOD PRESSURE: 84 MMHG

## 2021-08-09 PROCEDURE — 93000 ELECTROCARDIOGRAM COMPLETE: CPT

## 2021-08-09 PROCEDURE — 99214 OFFICE O/P EST MOD 30 MIN: CPT

## 2022-02-10 ENCOUNTER — RESULT CHARGE (OUTPATIENT)
Age: 67
End: 2022-02-10

## 2022-02-11 ENCOUNTER — NON-APPOINTMENT (OUTPATIENT)
Age: 67
End: 2022-02-11

## 2022-02-11 ENCOUNTER — APPOINTMENT (OUTPATIENT)
Dept: CARDIOLOGY | Facility: CLINIC | Age: 67
End: 2022-02-11
Payer: MEDICARE

## 2022-02-11 VITALS
TEMPERATURE: 98 F | SYSTOLIC BLOOD PRESSURE: 128 MMHG | HEART RATE: 82 BPM | WEIGHT: 250 LBS | HEIGHT: 72 IN | DIASTOLIC BLOOD PRESSURE: 80 MMHG | BODY MASS INDEX: 33.86 KG/M2 | OXYGEN SATURATION: 95 %

## 2022-02-11 PROCEDURE — 99214 OFFICE O/P EST MOD 30 MIN: CPT

## 2022-02-11 PROCEDURE — 93000 ELECTROCARDIOGRAM COMPLETE: CPT

## 2022-03-01 ENCOUNTER — APPOINTMENT (OUTPATIENT)
Dept: CARDIOLOGY | Facility: CLINIC | Age: 67
End: 2022-03-01
Payer: MEDICARE

## 2022-03-01 PROCEDURE — 93015 CV STRESS TEST SUPVJ I&R: CPT

## 2022-03-01 PROCEDURE — A9502: CPT

## 2022-03-01 PROCEDURE — 93306 TTE W/DOPPLER COMPLETE: CPT

## 2022-03-01 PROCEDURE — 78452 HT MUSCLE IMAGE SPECT MULT: CPT

## 2022-03-07 ENCOUNTER — NON-APPOINTMENT (OUTPATIENT)
Age: 67
End: 2022-03-07

## 2022-03-20 ENCOUNTER — RESULT CHARGE (OUTPATIENT)
Age: 67
End: 2022-03-20

## 2022-03-21 ENCOUNTER — APPOINTMENT (OUTPATIENT)
Dept: CARDIOLOGY | Facility: CLINIC | Age: 67
End: 2022-03-21
Payer: MEDICARE

## 2022-03-21 ENCOUNTER — NON-APPOINTMENT (OUTPATIENT)
Age: 67
End: 2022-03-21

## 2022-03-21 VITALS
DIASTOLIC BLOOD PRESSURE: 58 MMHG | TEMPERATURE: 97.7 F | OXYGEN SATURATION: 98 % | HEIGHT: 72 IN | WEIGHT: 250 LBS | SYSTOLIC BLOOD PRESSURE: 110 MMHG | BODY MASS INDEX: 33.86 KG/M2 | HEART RATE: 71 BPM

## 2022-03-21 PROCEDURE — 99214 OFFICE O/P EST MOD 30 MIN: CPT

## 2022-08-01 ENCOUNTER — APPOINTMENT (OUTPATIENT)
Dept: CARDIOLOGY | Facility: CLINIC | Age: 67
End: 2022-08-01

## 2022-08-01 VITALS
OXYGEN SATURATION: 98 % | SYSTOLIC BLOOD PRESSURE: 124 MMHG | WEIGHT: 245 LBS | HEIGHT: 74 IN | DIASTOLIC BLOOD PRESSURE: 70 MMHG | BODY MASS INDEX: 31.44 KG/M2 | HEART RATE: 60 BPM | TEMPERATURE: 97.3 F

## 2022-08-01 PROCEDURE — 99214 OFFICE O/P EST MOD 30 MIN: CPT

## 2022-08-01 RX ORDER — FLUTICASONE PROPIONATE 50 UG/1
50 SPRAY, METERED NASAL
Qty: 16 | Refills: 0 | Status: ACTIVE | COMMUNITY
Start: 2022-05-11

## 2022-08-01 RX ORDER — ALBUTEROL SULFATE 2.5 MG/3ML
(2.5 MG/3ML) SOLUTION RESPIRATORY (INHALATION)
Qty: 75 | Refills: 0 | Status: ACTIVE | COMMUNITY
Start: 2022-06-25

## 2022-08-01 RX ORDER — PANTOPRAZOLE 20 MG/1
20 TABLET, DELAYED RELEASE ORAL
Qty: 30 | Refills: 0 | Status: ACTIVE | COMMUNITY
Start: 2022-01-21

## 2023-02-02 ENCOUNTER — APPOINTMENT (OUTPATIENT)
Dept: ORTHOPEDIC SURGERY | Facility: CLINIC | Age: 68
End: 2023-02-02
Payer: MEDICARE

## 2023-02-02 DIAGNOSIS — M79.644 PAIN IN RIGHT FINGER(S): ICD-10-CM

## 2023-02-02 DIAGNOSIS — R20.0 ANESTHESIA OF SKIN: ICD-10-CM

## 2023-02-02 PROCEDURE — 99203 OFFICE O/P NEW LOW 30 MIN: CPT

## 2023-02-02 PROCEDURE — 99213 OFFICE O/P EST LOW 20 MIN: CPT

## 2023-02-02 PROCEDURE — 73140 X-RAY EXAM OF FINGER(S): CPT | Mod: RT

## 2023-02-02 NOTE — PHYSICAL EXAM
[Right] : right hand [NL (90)] : supination 90 degrees [5___] : grasp 5[unfilled]/5 [] : no tenderness over hand [There are no fractures, subluxations or dislocations. No significant abnormalities are seen] : There are no fractures, subluxations or dislocations. No significant abnormalities are seen [Degenerative change] : Degenerative change [de-identified] : decrease sensation to thumb finger tip, distal to DIP [FreeTextEntry9] : no lesion [TWNoteComboBox7] : dorsiflexion 60 degrees [TWNoteComboBox4] : volarflexion 70 degrees [de-identified] : radial deviation 20 degrees [TWNoteComboBox9] : ulnar deviation 40 degrees

## 2023-02-02 NOTE — HISTORY OF PRESENT ILLNESS
[de-identified] : Patient presents today fo revaluation of Right wrist pain, patient had RT CTR with Dr Gan on 1/20/21. Patient denies new injury, patient complaining of constant throbbing pain that started again about 3-4 months ago. He also complains of numbness/tingling mostly in the thumb. Patient has tried wearing a brace however it has not helped.

## 2023-02-03 ENCOUNTER — APPOINTMENT (OUTPATIENT)
Dept: ORTHOPEDIC SURGERY | Facility: CLINIC | Age: 68
End: 2023-02-03
Payer: MEDICARE

## 2023-02-03 PROCEDURE — 73110 X-RAY EXAM OF WRIST: CPT | Mod: 50

## 2023-02-03 PROCEDURE — 99213 OFFICE O/P EST LOW 20 MIN: CPT

## 2023-02-03 NOTE — HISTORY OF PRESENT ILLNESS
[de-identified] : 67M, RHD, PMHX of DM II, HLD, presents with right wrist pain and right thumb and also left wrist and left ring finger pain for approx 6 months. Did have RT CTR w/ Dr. Gan on 1/20/21 - did well. Admits to pain, admits to numbness/tingling in the right index finger. Denies injury/trauma.

## 2023-02-03 NOTE — IMAGING
[de-identified] : Right wrist with no swelling nor erythema. Able to make fist, oppose thumb to small finger and abduct fingers, no overt atrophy. -Phalen's, -tinel at carpal, -tinel at Guyon, -tinel at cubital. -froment, -wartenberg. Intact sensation in median (burning and tingling at thumb and index pulp). and intact at superficial radial and intact in small and ulnar ring finger(normal at ulnar hand) prior to provocative testing. <2sec cap refill.

## 2023-02-03 NOTE — ASSESSMENT
[FreeTextEntry1] : Right CTS vs radicular symptoms - reviewed pathoanatomy. Encouraged nighttime cockup wrist bracing and elbow extension bracing. Will obtain RUE EMG/NCV in light of severity of symptoms - patient will follow-up thereafter to discuss results and develop plan-of-care. Patient with much improvement following CTR but with renewed numbness/burning into finger tips. In light of resolution following release, patient with likely a complete release. It is early to have recurrence. Discussed possibility of a double crush at neck.\par \par F/u after EMG/NCV

## 2023-02-06 ENCOUNTER — APPOINTMENT (OUTPATIENT)
Dept: CARDIOLOGY | Facility: CLINIC | Age: 68
End: 2023-02-06

## 2023-02-21 ENCOUNTER — APPOINTMENT (OUTPATIENT)
Dept: NEUROLOGY | Facility: CLINIC | Age: 68
End: 2023-02-21
Payer: MEDICARE

## 2023-02-21 DIAGNOSIS — G56.01 CARPAL TUNNEL SYNDROME, RIGHT UPPER LIMB: ICD-10-CM

## 2023-02-21 PROCEDURE — 95911 NRV CNDJ TEST 9-10 STUDIES: CPT

## 2023-02-21 PROCEDURE — 95886 MUSC TEST DONE W/N TEST COMP: CPT

## 2023-02-24 ENCOUNTER — APPOINTMENT (OUTPATIENT)
Dept: ORTHOPEDIC SURGERY | Facility: CLINIC | Age: 68
End: 2023-02-24
Payer: MEDICARE

## 2023-02-24 PROCEDURE — 99214 OFFICE O/P EST MOD 30 MIN: CPT

## 2023-02-25 NOTE — ASSESSMENT
[FreeTextEntry1] : Right CTS vs radicular symptoms - reviewed pathoanatomy. Encouraged nighttime cockup wrist bracing and elbow extension bracing. Reviewed RUE EMG/NCV of right sided chronic C7, C8 >C6 radiculopathy and continued APB denervation and CTS. Patient with much improvement following CTR but with renewed numbness/burning into finger tips. In light of resolution following release, patient with likely a complete release. It is early to have recurrence. Discussed possibility of a double crush at neck.\par \par F/u for spine consultation

## 2023-02-25 NOTE — HISTORY OF PRESENT ILLNESS
[de-identified] : 67M, RHD, PMHX of DM II, HLD, presents with right wrist pain and right thumb and also left wrist and left ring finger pain for approx 6 months. Did have RT CTR w/ Dr. Gan on 1/20/21 - did well. Admits to pain, admits to numbness/tingling in the right index finger. Denies injury/trauma. \par \par 2/24/23: f/u right hand/wrist. Reports still having significant symptoms. Here for EMG results. He reports last week was walking into a store, when the stairs were being renovated. Was unaware, tripped and fell. Admits to putting hands out to brace the fall and since then has had shoulder pain. Admits to going to [facility] - radiographs obtained and negative for fracture/dislocation.

## 2023-02-25 NOTE — IMAGING
[de-identified] : Right wrist with no swelling nor erythema. Able to make fist, oppose thumb to small finger and abduct fingers, no overt atrophy. -Phalen's, -tinel at carpal, -tinel at Guyon, -tinel at cubital. -froment, -wartenberg. Intact sensation in median (burning and tingling at thumb and index pulp). and intact at superficial radial and intact in small and ulnar ring finger(normal at ulnar hand) prior to provocative testing. <2sec cap refill.

## 2023-02-28 ENCOUNTER — NON-APPOINTMENT (OUTPATIENT)
Age: 68
End: 2023-02-28

## 2023-02-28 ENCOUNTER — APPOINTMENT (OUTPATIENT)
Dept: CARDIOLOGY | Facility: CLINIC | Age: 68
End: 2023-02-28
Payer: MEDICARE

## 2023-02-28 VITALS
DIASTOLIC BLOOD PRESSURE: 90 MMHG | HEART RATE: 77 BPM | SYSTOLIC BLOOD PRESSURE: 138 MMHG | OXYGEN SATURATION: 95 % | HEIGHT: 72 IN | BODY MASS INDEX: 34.13 KG/M2 | WEIGHT: 252 LBS

## 2023-02-28 PROCEDURE — 93000 ELECTROCARDIOGRAM COMPLETE: CPT

## 2023-02-28 PROCEDURE — 99214 OFFICE O/P EST MOD 30 MIN: CPT

## 2023-03-23 ENCOUNTER — APPOINTMENT (OUTPATIENT)
Dept: CARDIOLOGY | Facility: CLINIC | Age: 68
End: 2023-03-23

## 2023-03-29 ENCOUNTER — FORM ENCOUNTER (OUTPATIENT)
Age: 68
End: 2023-03-29

## 2023-04-07 ENCOUNTER — APPOINTMENT (OUTPATIENT)
Dept: ORTHOPEDIC SURGERY | Facility: CLINIC | Age: 68
End: 2023-04-07
Payer: MEDICARE

## 2023-04-07 DIAGNOSIS — R20.0 ANESTHESIA OF SKIN: ICD-10-CM

## 2023-04-07 DIAGNOSIS — R20.2 ANESTHESIA OF SKIN: ICD-10-CM

## 2023-04-07 PROCEDURE — 99214 OFFICE O/P EST MOD 30 MIN: CPT

## 2023-04-07 NOTE — ASSESSMENT
[FreeTextEntry1] : Right CTS vs radicular symptoms - reviewed pathoanatomy. Encouraged nighttime cockup wrist bracing and elbow extension bracing. Reviewed RUE EMG/NCV of right sided chronic C7, C8 >C6 radiculopathy and continued APB denervation and CTS. Patient with much improvement following CTR but with renewed numbness/burning into finger tips. In light of resolution following release, patient with likely a complete release. It is early to have recurrence. Discussed possibility of a double crush at neck.\par \par Right shoulder pain - Reviewed right shoulder MRI. Patient has been doing PT with much benefit, will continue.\par \par F/u for spine consultation

## 2023-04-07 NOTE — IMAGING
[de-identified] : Right wrist with no swelling nor erythema. Able to make fist, oppose thumb to small finger and abduct fingers, no overt atrophy. -Phalen's, -tinel at carpal, -tinel at Guyon, -tinel at cubital. -froment, -wartenberg. Intact sensation in median (burning and tingling at thumb and index pulp). and intact at superficial radial and intact in small and ulnar ring finger(normal at ulnar hand) prior to provocative testing. <2sec cap refill. \par \par Right shoulder MRI with mild arthrosis, surpa tear/tendonitis, AC joint arthrosis.

## 2023-04-07 NOTE — HISTORY OF PRESENT ILLNESS
[de-identified] : 67M, RHD, PMHX of DM II, HLD, presents with right wrist pain and right thumb and also left wrist and left ring finger pain for approx 6 months. Did have RT CTR w/ Dr. Gan on 1/20/21 - did well. Admits to pain, admits to numbness/tingling in the right index finger. Denies injury/trauma. \par \par 2/24/23: f/u right hand/wrist. Reports still having significant symptoms. Here for EMG results. He reports last week was walking into a store, when the stairs were being renovated. Was unaware, tripped and fell. Admits to putting hands out to brace the fall and since then has had shoulder pain. Admits to going to [facility] - radiographs obtained and negative for fracture/dislocation. \par \par 4/7/23: f/u right shoulder. He reports did have MRI done at [facility]. Patient still having significant pain  and would like to discuss his MRI results.

## 2023-05-01 ENCOUNTER — APPOINTMENT (OUTPATIENT)
Dept: ORTHOPEDIC SURGERY | Facility: CLINIC | Age: 68
End: 2023-05-01
Payer: MEDICARE

## 2023-05-01 VITALS — WEIGHT: 252 LBS | HEIGHT: 72 IN | BODY MASS INDEX: 34.13 KG/M2

## 2023-05-01 DIAGNOSIS — M54.12 RADICULOPATHY, CERVICAL REGION: ICD-10-CM

## 2023-05-01 PROCEDURE — 99214 OFFICE O/P EST MOD 30 MIN: CPT

## 2023-05-01 PROCEDURE — 72040 X-RAY EXAM NECK SPINE 2-3 VW: CPT

## 2023-05-01 RX ORDER — GABAPENTIN 100 MG/1
100 CAPSULE ORAL 3 TIMES DAILY
Qty: 90 | Refills: 1 | Status: ACTIVE | COMMUNITY
Start: 2023-05-01 | End: 1900-01-01

## 2023-05-01 NOTE — DATA REVIEWED
[EMG Nerve Conduction] : A EMG Nerve Conduction test was completed of the [Bilateral] : bilateral [Upper extremity] : upper extremity [Positive] : positive [Consistent with peripheral neuropathy] : consistent with peripheral neuropathy [Consistent with radiculopathy] : consistent with radiculopathy [FreeTextEntry1] : RIght chronic C6, C7, C8 radiculopathy, mild to moderate right carpal tunnel syndrome

## 2023-05-01 NOTE — ASSESSMENT
[FreeTextEntry1] : RIght chronic C6, C7, C8 radiculopathy, mild to moderate right carpal tunnel syndrome \par \par Patient given prescription for MRI, follow up after study is completed to discuss results. \par \par Patient will begin physical therapy. \par \par Recommend: - NSAID - Heating pad - Muscle relaxer - Neck stretching exercise - Soft cervical collar - Cervical traction Patient is given neck rehabilitation exercise book.

## 2023-05-01 NOTE — HISTORY OF PRESENT ILLNESS
[Neck] : neck [0] : 0 [de-identified] : Patient presents today for a neck evaluation per Dr. Graf. Patient denies having neck pain. States he has pain and numbness in the left wrist, left ring and small fingers, and the right thumb. Admits to taking Tylenol for pain. Denies recent imaging.  [] : no

## 2023-05-03 ENCOUNTER — NON-APPOINTMENT (OUTPATIENT)
Age: 68
End: 2023-05-03

## 2023-05-03 RX ORDER — KETOCONAZOLE 20.5 MG/ML
2 SHAMPOO, SUSPENSION TOPICAL
Qty: 120 | Refills: 0 | Status: DISCONTINUED | COMMUNITY
Start: 2022-11-29

## 2023-05-03 RX ORDER — FLUOCINONIDE 0.5 MG/ML
0.05 SOLUTION TOPICAL
Qty: 60 | Refills: 0 | Status: DISCONTINUED | COMMUNITY
Start: 2022-11-29

## 2023-05-03 RX ORDER — HYDROCORTISONE 25 MG/G
2.5 CREAM TOPICAL
Qty: 30 | Refills: 0 | Status: DISCONTINUED | COMMUNITY
Start: 2022-11-29

## 2023-05-07 ENCOUNTER — FORM ENCOUNTER (OUTPATIENT)
Age: 68
End: 2023-05-07

## 2023-05-16 ENCOUNTER — APPOINTMENT (OUTPATIENT)
Dept: CARDIOLOGY | Facility: CLINIC | Age: 68
End: 2023-05-16
Payer: MEDICARE

## 2023-05-16 VITALS
WEIGHT: 250 LBS | DIASTOLIC BLOOD PRESSURE: 64 MMHG | OXYGEN SATURATION: 97 % | HEIGHT: 72 IN | HEART RATE: 74 BPM | SYSTOLIC BLOOD PRESSURE: 140 MMHG | BODY MASS INDEX: 33.86 KG/M2

## 2023-05-16 DIAGNOSIS — I10 ESSENTIAL (PRIMARY) HYPERTENSION: ICD-10-CM

## 2023-05-16 PROCEDURE — 99214 OFFICE O/P EST MOD 30 MIN: CPT

## 2023-05-16 RX ORDER — IBUPROFEN 800 MG/1
800 TABLET, FILM COATED ORAL 3 TIMES DAILY
Refills: 0 | Status: DISCONTINUED | COMMUNITY
Start: 2017-08-17 | End: 2023-05-16

## 2023-06-09 ENCOUNTER — APPOINTMENT (OUTPATIENT)
Dept: ORTHOPEDIC SURGERY | Facility: CLINIC | Age: 68
End: 2023-06-09
Payer: MEDICARE

## 2023-06-09 DIAGNOSIS — M48.02 SPINAL STENOSIS, CERVICAL REGION: ICD-10-CM

## 2023-06-09 DIAGNOSIS — M50.220 OTHER CERVICAL DISC DISPLACEMENT, MID-CERVICAL REGION, UNSPECIFIED LEVEL: ICD-10-CM

## 2023-06-09 DIAGNOSIS — M62.838 OTHER MUSCLE SPASM: ICD-10-CM

## 2023-06-09 DIAGNOSIS — M47.812 SPONDYLOSIS W/OUT MYELOPATHY OR RADICULOPATHY, CERVICAL REGION: ICD-10-CM

## 2023-06-09 DIAGNOSIS — M50.320 OTHER CERVICAL DISC DEGENERATION, MID-CERVICAL REGION, UNSPECIFIED LEVEL: ICD-10-CM

## 2023-06-09 DIAGNOSIS — M47.22 OTHER SPONDYLOSIS WITH RADICULOPATHY, CERVICAL REGION: ICD-10-CM

## 2023-06-09 PROCEDURE — 99214 OFFICE O/P EST MOD 30 MIN: CPT

## 2023-06-09 NOTE — HISTORY OF PRESENT ILLNESS
[Neck] : neck [0] : 0 [de-identified] : F/U Cervical spine MRI done at [facility]  \par Pt reports no change since last visit\par States he has not started PT- "they do not have the space"- pt states he just finished PT for his shoulder. \par (pt given list of PT offices today)  [] : no

## 2023-06-09 NOTE — ASSESSMENT
[FreeTextEntry1] : RIght chronic C6, C7, C8 radiculopathy, mild to moderate right carpal tunnel syndrome \par \par HNP C5-6\par \par Patient will begin physical therapy. \par \par Referral to pain management for injections, follow up 2 weeks after injection. \par \par Recommend: - NSAID - Heating pad - Muscle relaxer - Neck stretching exercise - Soft cervical collar - Cervical traction Patient is given neck rehabilitation exercise book. '\par \par Follow up in 2 months

## 2023-06-09 NOTE — DATA REVIEWED
[MRI] : MRI [Cervical Spine] : cervical spine [Report was reviewed and noted in the chart] : The report was reviewed and noted in the chart [I independently reviewed and interpreted images and report] : I independently reviewed and interpreted images and report [FreeTextEntry1] : HNP C5-6\par HNP C5-6\par HNP C5-6

## 2023-08-02 RX ORDER — ATORVASTATIN CALCIUM 40 MG/1
40 TABLET, FILM COATED ORAL DAILY
Qty: 90 | Refills: 3 | Status: ACTIVE | COMMUNITY
Start: 2017-07-28 | End: 1900-01-01

## 2023-08-07 ENCOUNTER — APPOINTMENT (OUTPATIENT)
Dept: ORTHOPEDIC SURGERY | Facility: CLINIC | Age: 68
End: 2023-08-07

## 2023-09-22 RX ORDER — LISINOPRIL 10 MG/1
10 TABLET ORAL DAILY
Qty: 90 | Refills: 3 | Status: ACTIVE | COMMUNITY
Start: 2019-01-23 | End: 1900-01-01

## 2023-11-28 ENCOUNTER — APPOINTMENT (OUTPATIENT)
Dept: CARDIOLOGY | Facility: CLINIC | Age: 68
End: 2023-11-28

## 2024-01-30 ENCOUNTER — APPOINTMENT (OUTPATIENT)
Dept: CARDIOLOGY | Facility: CLINIC | Age: 69
End: 2024-01-30

## 2024-03-12 RX ORDER — CARVEDILOL 6.25 MG/1
6.25 TABLET, FILM COATED ORAL TWICE DAILY
Qty: 180 | Refills: 3 | Status: ACTIVE | COMMUNITY
Start: 2019-01-23 | End: 1900-01-01

## 2024-06-11 ENCOUNTER — APPOINTMENT (OUTPATIENT)
Dept: VASCULAR SURGERY | Facility: CLINIC | Age: 69
End: 2024-06-11
Payer: MEDICARE

## 2024-06-11 VITALS
DIASTOLIC BLOOD PRESSURE: 78 MMHG | HEIGHT: 72 IN | SYSTOLIC BLOOD PRESSURE: 136 MMHG | BODY MASS INDEX: 34.27 KG/M2 | WEIGHT: 253 LBS

## 2024-06-11 DIAGNOSIS — R73.03 PREDIABETES.: ICD-10-CM

## 2024-06-11 DIAGNOSIS — I82.469 UNSP CALF MUSCULAR VEIN ACUTE EMBOLISM AND THROMBOSIS: ICD-10-CM

## 2024-06-11 DIAGNOSIS — I83.90 ASYMPTOMATIC VARICOSE VEINS OF UNSPECIFIED LOWER EXTREMITY: ICD-10-CM

## 2024-06-11 PROCEDURE — 99203 OFFICE O/P NEW LOW 30 MIN: CPT

## 2024-06-11 RX ORDER — ALBUTEROL SULFATE 90 UG/1
108 (90 BASE) INHALANT RESPIRATORY (INHALATION)
Qty: 8 | Refills: 0 | Status: DISCONTINUED | COMMUNITY
Start: 2022-05-11 | End: 2024-06-11

## 2024-06-11 NOTE — HISTORY OF PRESENT ILLNESS
[FreeTextEntry1] : 68-year-old gentleman is being referred by Dr. Mireles to be evaluated for the superficial thrombophlebitis.  Patient has developed painful veins a few days ago.  He has a longstanding history of bilateral varicose veins.  He has been treated conservatively in the past with a compression stockings.  He had venous ultrasound done yesterday which showed DVT in the calf veins.  He started aspirin treatment.

## 2024-06-11 NOTE — PHYSICAL EXAM
[JVD] : no jugular venous distention  [Normal Breath Sounds] : Normal breath sounds [Normal Heart Sounds] : normal heart sounds [2+] : left 2+ [1+] : left 1+ [Ankle Swelling (On Exam)] : present [Ankle Swelling Bilaterally] : bilaterally  [Ankle Swelling On The Right] : mild [Varicose Veins Of Lower Extremities] : bilaterally [Ankle Swelling On The Left] : moderate [] : not present [Abdomen Masses] : Abdomen masses present [Abdomen Tenderness] : ~T ~M Abdominal tenderness [No Rash or Lesion] : No rash or lesion [Calm] : calm [de-identified] : Moderately overweight, no acute distress [FreeTextEntry1] : Superficial thrombophlebitis of the right calf. [de-identified] : Reducible large umbilical hernia

## 2024-06-11 NOTE — REVIEW OF SYSTEMS
[Leg Claudication] : no intermittent leg claudication [Lower Ext Edema] : lower extremity edema [Joint Swelling] : joint swelling [Limb Pain] : limb pain [Limb Swelling] : limb swelling [Negative] : Integumentary [FreeTextEntry7] : Large umbilical hernia history

## 2024-06-28 ENCOUNTER — NON-APPOINTMENT (OUTPATIENT)
Age: 69
End: 2024-06-28

## 2024-06-28 ENCOUNTER — APPOINTMENT (OUTPATIENT)
Dept: CARDIOLOGY | Facility: CLINIC | Age: 69
End: 2024-06-28
Payer: MEDICARE

## 2024-06-28 VITALS
SYSTOLIC BLOOD PRESSURE: 140 MMHG | HEART RATE: 85 BPM | DIASTOLIC BLOOD PRESSURE: 64 MMHG | WEIGHT: 256 LBS | BODY MASS INDEX: 34.72 KG/M2 | OXYGEN SATURATION: 98 %

## 2024-06-28 DIAGNOSIS — E78.5 HYPERLIPIDEMIA, UNSPECIFIED: ICD-10-CM

## 2024-06-28 DIAGNOSIS — E11.9 TYPE 2 DIABETES MELLITUS W/OUT COMPLICATIONS: ICD-10-CM

## 2024-06-28 DIAGNOSIS — I35.0 NONRHEUMATIC AORTIC (VALVE) STENOSIS: ICD-10-CM

## 2024-06-28 DIAGNOSIS — I80.00 PHLEBITIS AND THROMBOPHLEBITIS OF SUPERFICIAL VESSELS OF UNSPECIFIED LOWER EXTREMITY: ICD-10-CM

## 2024-06-28 DIAGNOSIS — I25.10 ATHEROSCLEROTIC HEART DISEASE OF NATIVE CORONARY ARTERY W/OUT ANGINA PECTORIS: ICD-10-CM

## 2024-06-28 PROCEDURE — G2211 COMPLEX E/M VISIT ADD ON: CPT

## 2024-06-28 PROCEDURE — 93000 ELECTROCARDIOGRAM COMPLETE: CPT

## 2024-06-28 PROCEDURE — 99214 OFFICE O/P EST MOD 30 MIN: CPT

## 2024-07-09 ENCOUNTER — APPOINTMENT (OUTPATIENT)
Dept: VASCULAR SURGERY | Facility: CLINIC | Age: 69
End: 2024-07-09
Payer: MEDICARE

## 2024-07-09 VITALS
BODY MASS INDEX: 34.67 KG/M2 | DIASTOLIC BLOOD PRESSURE: 86 MMHG | WEIGHT: 256 LBS | SYSTOLIC BLOOD PRESSURE: 138 MMHG | HEIGHT: 72 IN

## 2024-07-09 DIAGNOSIS — I83.90 ASYMPTOMATIC VARICOSE VEINS OF UNSPECIFIED LOWER EXTREMITY: ICD-10-CM

## 2024-07-09 DIAGNOSIS — I80.00 PHLEBITIS AND THROMBOPHLEBITIS OF SUPERFICIAL VESSELS OF UNSPECIFIED LOWER EXTREMITY: ICD-10-CM

## 2024-07-09 DIAGNOSIS — I82.469 UNSP CALF MUSCULAR VEIN ACUTE EMBOLISM AND THROMBOSIS: ICD-10-CM

## 2024-07-09 DIAGNOSIS — R73.03 PREDIABETES.: ICD-10-CM

## 2024-07-09 PROCEDURE — 93970 EXTREMITY STUDY: CPT

## 2024-07-09 PROCEDURE — 99214 OFFICE O/P EST MOD 30 MIN: CPT

## 2024-09-24 ENCOUNTER — APPOINTMENT (OUTPATIENT)
Dept: CARDIOLOGY | Facility: CLINIC | Age: 69
End: 2024-09-24

## 2024-10-22 ENCOUNTER — NON-APPOINTMENT (OUTPATIENT)
Age: 69
End: 2024-10-22

## 2024-10-22 ENCOUNTER — APPOINTMENT (OUTPATIENT)
Dept: CARDIOLOGY | Facility: CLINIC | Age: 69
End: 2024-10-22
Payer: MEDICARE

## 2024-10-22 VITALS
HEIGHT: 72 IN | OXYGEN SATURATION: 98 % | HEART RATE: 74 BPM | SYSTOLIC BLOOD PRESSURE: 112 MMHG | BODY MASS INDEX: 34.54 KG/M2 | WEIGHT: 255 LBS | DIASTOLIC BLOOD PRESSURE: 70 MMHG

## 2024-10-22 DIAGNOSIS — E78.5 HYPERLIPIDEMIA, UNSPECIFIED: ICD-10-CM

## 2024-10-22 DIAGNOSIS — I10 ESSENTIAL (PRIMARY) HYPERTENSION: ICD-10-CM

## 2024-10-22 DIAGNOSIS — I35.0 NONRHEUMATIC AORTIC (VALVE) STENOSIS: ICD-10-CM

## 2024-10-22 DIAGNOSIS — I25.10 ATHEROSCLEROTIC HEART DISEASE OF NATIVE CORONARY ARTERY W/OUT ANGINA PECTORIS: ICD-10-CM

## 2024-10-22 PROCEDURE — G2211 COMPLEX E/M VISIT ADD ON: CPT

## 2024-10-22 PROCEDURE — 99214 OFFICE O/P EST MOD 30 MIN: CPT

## 2024-10-22 RX ORDER — PENTOXIFYLLINE 400 MG/1
400 TABLET, FILM COATED, EXTENDED RELEASE ORAL 3 TIMES DAILY
Refills: 0 | Status: ACTIVE | COMMUNITY

## 2025-04-22 ENCOUNTER — APPOINTMENT (OUTPATIENT)
Dept: CARDIOLOGY | Facility: CLINIC | Age: 70
End: 2025-04-22
Payer: MEDICARE

## 2025-04-22 ENCOUNTER — NON-APPOINTMENT (OUTPATIENT)
Age: 70
End: 2025-04-22

## 2025-04-22 VITALS
OXYGEN SATURATION: 97 % | SYSTOLIC BLOOD PRESSURE: 120 MMHG | HEART RATE: 85 BPM | HEIGHT: 72 IN | DIASTOLIC BLOOD PRESSURE: 72 MMHG | WEIGHT: 254 LBS | BODY MASS INDEX: 34.4 KG/M2

## 2025-04-22 DIAGNOSIS — E78.5 HYPERLIPIDEMIA, UNSPECIFIED: ICD-10-CM

## 2025-04-22 DIAGNOSIS — I82.469 UNSP CALF MUSCULAR VEIN ACUTE EMBOLISM AND THROMBOSIS: ICD-10-CM

## 2025-04-22 DIAGNOSIS — I35.0 NONRHEUMATIC AORTIC (VALVE) STENOSIS: ICD-10-CM

## 2025-04-22 DIAGNOSIS — I25.10 ATHEROSCLEROTIC HEART DISEASE OF NATIVE CORONARY ARTERY W/OUT ANGINA PECTORIS: ICD-10-CM

## 2025-04-22 DIAGNOSIS — I10 ESSENTIAL (PRIMARY) HYPERTENSION: ICD-10-CM

## 2025-04-22 PROCEDURE — 99214 OFFICE O/P EST MOD 30 MIN: CPT

## 2025-04-22 PROCEDURE — 93000 ELECTROCARDIOGRAM COMPLETE: CPT

## 2025-04-22 PROCEDURE — G2211 COMPLEX E/M VISIT ADD ON: CPT

## 2025-04-22 RX ORDER — ATORVASTATIN CALCIUM 80 MG/1
80 TABLET, FILM COATED ORAL DAILY
Refills: 0 | Status: ACTIVE | COMMUNITY